# Patient Record
Sex: FEMALE | Race: WHITE | NOT HISPANIC OR LATINO | Employment: UNEMPLOYED | URBAN - METROPOLITAN AREA
[De-identification: names, ages, dates, MRNs, and addresses within clinical notes are randomized per-mention and may not be internally consistent; named-entity substitution may affect disease eponyms.]

---

## 2017-12-06 ENCOUNTER — OFFICE VISIT (OUTPATIENT)
Dept: FAMILY MEDICINE CLINIC | Facility: CLINIC | Age: 47
End: 2017-12-06

## 2017-12-06 VITALS
HEIGHT: 55 IN | RESPIRATION RATE: 16 BRPM | HEART RATE: 98 BPM | TEMPERATURE: 98.3 F | OXYGEN SATURATION: 98 % | DIASTOLIC BLOOD PRESSURE: 84 MMHG | WEIGHT: 174 LBS | BODY MASS INDEX: 40.27 KG/M2 | SYSTOLIC BLOOD PRESSURE: 122 MMHG

## 2017-12-06 DIAGNOSIS — N92.1 MENORRHAGIA WITH IRREGULAR CYCLE: ICD-10-CM

## 2017-12-06 DIAGNOSIS — Z12.31 SCREENING MAMMOGRAM, ENCOUNTER FOR: ICD-10-CM

## 2017-12-06 DIAGNOSIS — N92.6 MENSTRUAL PERIODS IRREGULAR: Primary | ICD-10-CM

## 2017-12-06 DIAGNOSIS — Z13.220 SCREENING, LIPID: ICD-10-CM

## 2017-12-06 PROCEDURE — 99203 OFFICE O/P NEW LOW 30 MIN: CPT | Performed by: PHYSICIAN ASSISTANT

## 2017-12-06 NOTE — PROGRESS NOTES
"Subjective   Karina Powell is a 47 y.o. female.   Chief Complaint   Patient presents with   • lump in throat     np to establish   • menstral issue   • Rash     left leg       History of Present Illness     Karina is a 47-year-old female who presents to establish at today's office visit.  I have reviewed her health history form will try to obtain prior medical records for my review.  She has multiple complaints at today's office visit. Karina recently moved from [place].  She has not had a Pap smear in the past 3-4 years.  She has never had a mammogram.  States she has been having irregular periods home of November.  States she has had heavy flow with large clots.  She has a history of fibroid disease.  Describes her clot formation as worse size $2 coin-sized.  She has had no pain with her periods.  She has had 3 live births.  States after her last pregnancy her periods have become irregular.  She denied any vaginal discharge, pain with intercourse, nausea, vomiting, or abdominal pain.  Using condoms for birth control.  Karina has had a \"lump in her throat\" over the past several months.  She thought it was stress and anxiety related to her move.  She has had some weight gain, constipation and dry skin off and on for several months.  Denied any cold intolerance, hair loss, or mood swings.  Karina has had a rash on her bilateral hips and left lower leg that occurred several weeks ago.  States it itches off and on.  Denied any change in detergents, lotions, soaps or foods.  Karina has not been using any over-the-counter medications.  Appetite has been healthy by decreasing carbohydrates.  Sleep has been normal.  She has not been exercising due to her recent moved to [place].  She has good bowel's support his have a history of chronic lymphocytic leukemia.  She denied any upper history symptoms, chest pain, shortness of air, dysuria, vaginal discharge, abdominal pain or swelling of ankles.       The following " "portions of the patient's history were reviewed and updated as appropriate: allergies, current medications, past family history, past medical history, past social history and past surgical history.    Review of Systems   Constitutional: Negative.    HENT: Negative.    Eyes: Negative.    Respiratory: Negative.  Negative for cough, shortness of breath and wheezing.    Cardiovascular: Negative.  Negative for chest pain, palpitations and leg swelling.   Gastrointestinal: Negative.    Endocrine: Negative.    Genitourinary: Positive for menstrual problem and vaginal bleeding. Negative for decreased urine volume, dyspareunia, pelvic pain, urgency, vaginal discharge and vaginal pain.   Musculoskeletal: Negative.    Skin: Negative.    Allergic/Immunologic: Negative.    Neurological: Negative.    Hematological: Negative.    Psychiatric/Behavioral: Negative.  Negative for sleep disturbance and suicidal ideas.   All other systems reviewed and are negative.    Vitals:    12/06/17 0952   BP: 122/84   BP Location: Right arm   Patient Position: Sitting   Cuff Size: Large Adult   Pulse: 98   Resp: 16   Temp: 98.3 °F (36.8 °C)   TempSrc: Oral   SpO2: 98%   Weight: 78.9 kg (174 lb)   Height: 65 cm (25.59\")     Body mass index is 186.8 kg/(m^2).  No Known Allergies    Objective   Physical Exam   Constitutional: She is oriented to person, place, and time. Vital signs are normal. She appears well-developed and well-nourished.   HENT:   Head: Normocephalic and atraumatic.   Right Ear: Hearing, tympanic membrane, external ear and ear canal normal.   Left Ear: Hearing, tympanic membrane, external ear and ear canal normal.   Nose: Nose normal. Right sinus exhibits no maxillary sinus tenderness and no frontal sinus tenderness. Left sinus exhibits no maxillary sinus tenderness and no frontal sinus tenderness.   Mouth/Throat: Uvula is midline, oropharynx is clear and moist and mucous membranes are normal.   Eyes: Conjunctivae, EOM and lids are " normal.   Neck: Trachea normal and phonation normal. Neck supple. No edema present. No thyroid mass and no thyromegaly present.   Cardiovascular: Normal rate, regular rhythm, S1 normal, S2 normal, normal heart sounds and normal pulses.    Pulmonary/Chest: Effort normal and breath sounds normal.   Abdominal: Soft. Normal appearance, normal aorta and bowel sounds are normal. There is no hepatomegaly. There is no tenderness.   Lymphadenopathy:     She has no cervical adenopathy.   Neurological: She is alert and oriented to person, place, and time.   Reflex Scores:       Patellar reflexes are 2+ on the right side and 2+ on the left side.  Skin: Skin is warm, dry and intact.   Psychiatric: She has a normal mood and affect. Her speech is normal and behavior is normal. Judgment and thought content normal. Cognition and memory are normal.   Very talkative       Assessment/Plan   Karina was seen today for lump in throat, menstral issue and rash.    Diagnoses and all orders for this visit:    Menstrual periods irregular  -     CBC & Differential  -     Comprehensive Metabolic Panel  -     Thyroid Panel With TSH  -     Protime-INR  -     aPTT    Menorrhagia with irregular cycle  -     CBC & Differential  -     Comprehensive Metabolic Panel  -     Thyroid Panel With TSH  -     Protime-INR  -     aPTT    Screening, lipid  -     Lipid Panel With LDL / HDL Ratio    Screening mammogram, encounter for  -     Mammo Screening Bilateral With CAD; Future      1.  New Irregular periods with menorrhagia: I have reviewed Mere's health history form with her at today's office visit.  I will try to obtain prior medical records for my review.  She presents with multiple complaints at today's office visit.  Karina will have a CBC, CMP, thyroid profile with TSH, protime and APTT blood work collected at today's office visit.  I've asked her to return to office in the next 2 weeks for annual physical examination with Pap smear.  I will  consider transvaginal ultrasound if warranted.  Karina will be notified of test results when completed.  2.  Need for screening mammogram: Karina has not had a mammogram at this time.  Recently moved from [place].  I have placed an mammogram ordered to be performed at the [facility].  Karina will schedule her own appointment.  She will be notified of test results when completed.  3.  New screening lipid panel: She is fasting will have a lipid profile collected at today's office visit for further evaluation.  She'll be notified of test results when completed.         Dragon transcription disclaimer    Much of this encounter note is an electronic transcription/translation of spoken language to printed text.  The electronic translation of spoken language may permit erroneous, or at times, nonsensical words or phrases to be inadvertently transcribed.  Although I have reviewed the note for such errors, some may still exist.    Gladys Gandhi PA-C  Family Practice

## 2017-12-07 LAB
ALBUMIN SERPL-MCNC: 4.4 G/DL (ref 3.5–5.2)
ALBUMIN/GLOB SERPL: 2 G/DL
ALP SERPL-CCNC: 36 U/L (ref 40–129)
ALT SERPL-CCNC: 66 U/L (ref 5–33)
APTT PPP: 28 SECONDS (ref 24.3–38.1)
AST SERPL-CCNC: 29 U/L (ref 5–32)
BASOPHILS # BLD AUTO: 0.05 10*3/MM3 (ref 0–0.2)
BASOPHILS NFR BLD AUTO: 0.8 % (ref 0–2)
BILIRUB SERPL-MCNC: 0.4 MG/DL (ref 0.2–1.2)
BUN SERPL-MCNC: 15 MG/DL (ref 6–20)
BUN/CREAT SERPL: 19 (ref 7–25)
CALCIUM SERPL-MCNC: 9 MG/DL (ref 8.6–10.5)
CHLORIDE SERPL-SCNC: 102 MMOL/L (ref 98–107)
CHOLEST SERPL-MCNC: 223 MG/DL (ref 0–200)
CO2 SERPL-SCNC: 23.4 MMOL/L (ref 22–29)
CREAT SERPL-MCNC: 0.79 MG/DL (ref 0.57–1)
EOSINOPHIL # BLD AUTO: 0.14 10*3/MM3 (ref 0.1–0.3)
EOSINOPHIL NFR BLD AUTO: 2.2 % (ref 0–4)
ERYTHROCYTE [DISTWIDTH] IN BLOOD BY AUTOMATED COUNT: 12.8 % (ref 11.5–14.5)
FT4I SERPL CALC-MCNC: 1.5 (ref 1.2–4.9)
GFR SERPLBLD CREATININE-BSD FMLA CKD-EPI: 79 ML/MIN/1.73
GFR SERPLBLD CREATININE-BSD FMLA CKD-EPI: 95 ML/MIN/1.73
GLOBULIN SER CALC-MCNC: 2.2 GM/DL
GLUCOSE SERPL-MCNC: 103 MG/DL (ref 65–99)
HCT VFR BLD AUTO: 41 % (ref 37–47)
HDLC SERPL-MCNC: 54 MG/DL (ref 40–60)
HGB BLD-MCNC: 13.1 G/DL (ref 12–16)
IMM GRANULOCYTES # BLD: 0.01 10*3/MM3 (ref 0–0.03)
IMM GRANULOCYTES NFR BLD: 0.2 % (ref 0–0.5)
INR PPP: 0.94 (ref 0.9–1.1)
LDLC SERPL CALC-MCNC: 141 MG/DL (ref 0–100)
LDLC/HDLC SERPL: 2.6 {RATIO}
LYMPHOCYTES # BLD AUTO: 1.34 10*3/MM3 (ref 0.6–4.8)
LYMPHOCYTES NFR BLD AUTO: 21.1 % (ref 20–45)
MCH RBC QN AUTO: 29.6 PG (ref 27–31)
MCHC RBC AUTO-ENTMCNC: 32 G/DL (ref 31–37)
MCV RBC AUTO: 92.8 FL (ref 81–99)
MONOCYTES # BLD AUTO: 0.38 10*3/MM3 (ref 0–1)
MONOCYTES NFR BLD AUTO: 6 % (ref 3–8)
NEUTROPHILS # BLD AUTO: 4.42 10*3/MM3 (ref 1.5–8.3)
NEUTROPHILS NFR BLD AUTO: 69.7 % (ref 45–70)
NRBC BLD AUTO-RTO: 0 /100 WBC (ref 0–0)
PLATELET # BLD AUTO: 271 10*3/MM3 (ref 140–500)
POTASSIUM SERPL-SCNC: 4.5 MMOL/L (ref 3.5–5.2)
PROT SERPL-MCNC: 6.6 G/DL (ref 6–8.5)
PROTHROMBIN TIME: 12.6 SECONDS (ref 12.1–15)
RBC # BLD AUTO: 4.42 10*6/MM3 (ref 4.2–5.4)
SODIUM SERPL-SCNC: 140 MMOL/L (ref 136–145)
T3RU NFR SERPL: 25 % (ref 24–39)
T4 SERPL-MCNC: 5.9 UG/DL (ref 4.5–12)
TRIGL SERPL-MCNC: 142 MG/DL (ref 0–150)
TSH SERPL DL<=0.005 MIU/L-ACNC: 1.4 UIU/ML (ref 0.45–4.5)
VLDLC SERPL CALC-MCNC: 28.4 MG/DL (ref 7–27)
WBC # BLD AUTO: 6.34 10*3/MM3 (ref 4.8–10.8)

## 2017-12-08 LAB
EBV VCA IGG SER IA-ACNC: 187 U/ML (ref 0–17.9)
EBV VCA IGM SER IA-ACNC: <36 U/ML (ref 0–35.9)
WRITTEN AUTHORIZATION: NORMAL

## 2017-12-11 ENCOUNTER — TELEPHONE (OUTPATIENT)
Dept: FAMILY MEDICINE CLINIC | Facility: CLINIC | Age: 47
End: 2017-12-11

## 2017-12-11 NOTE — TELEPHONE ENCOUNTER
----- Message from Gladys Gandhi PA-C sent at 12/11/2017  6:48 AM EST -----  1.  Notify patient that CBC was normal.  She is not anemic.  2.  Fasting blood sugar was slightly elevated at 103.  Her ALT was slightly elevated at 66.  We will monitor this for now.  3.  Cholesterol was 223, triglycerides 142, HDL 54 and LDL was 141.  Her cholesterol readings were slightly elevated.  Would encourage Karina to decrease her fatty food intake and increase physical activity.  Plan to repeat fasting blood work in 6 months.  4.  Thyroid profile was normal.  5.  Pro time and APTT were normal.  6.  Taye-Hughes  virus shows that she has had mono in the past.  No acute mono is noted.

## 2017-12-14 ENCOUNTER — PROCEDURE VISIT (OUTPATIENT)
Dept: FAMILY MEDICINE CLINIC | Facility: CLINIC | Age: 47
End: 2017-12-14

## 2017-12-14 VITALS
OXYGEN SATURATION: 98 % | WEIGHT: 173 LBS | BODY MASS INDEX: 29.53 KG/M2 | DIASTOLIC BLOOD PRESSURE: 84 MMHG | SYSTOLIC BLOOD PRESSURE: 124 MMHG | HEART RATE: 97 BPM | HEIGHT: 64 IN | RESPIRATION RATE: 16 BRPM | TEMPERATURE: 98.5 F

## 2017-12-14 DIAGNOSIS — J02.9 SORETHROAT: ICD-10-CM

## 2017-12-14 DIAGNOSIS — B37.31 VAGINA, CANDIDIASIS: ICD-10-CM

## 2017-12-14 DIAGNOSIS — N92.1 MENORRHAGIA WITH IRREGULAR CYCLE: ICD-10-CM

## 2017-12-14 DIAGNOSIS — N89.8 VAGINAL DISCHARGE: ICD-10-CM

## 2017-12-14 DIAGNOSIS — Z01.419 PAP SMEAR, AS PART OF ROUTINE GYNECOLOGICAL EXAMINATION: Primary | ICD-10-CM

## 2017-12-14 DIAGNOSIS — Z20.818 STREP THROAT EXPOSURE: ICD-10-CM

## 2017-12-14 LAB
BILIRUB BLD-MCNC: NEGATIVE MG/DL
CLARITY, POC: CLEAR
COLOR UR: YELLOW
DEVELOPER EXPIRATION DATE: NORMAL
DEVELOPER LOT NUMBER: NORMAL
EXPIRATION DATE: NORMAL
EXPIRATION DATE: NORMAL
FECAL OCCULT BLOOD SCREEN, POC: NEGATIVE
GLUCOSE UR STRIP-MCNC: NEGATIVE MG/DL
INTERNAL CONTROL: NORMAL
KETONES UR QL: NEGATIVE
LEUKOCYTE EST, POC: NEGATIVE
Lab: NORMAL
Lab: NORMAL
NEGATIVE CONTROL: NEGATIVE
NITRITE UR-MCNC: NEGATIVE MG/ML
PH UR: 6 [PH] (ref 5–8)
POSITIVE CONTROL: POSITIVE
PROT UR STRIP-MCNC: NEGATIVE MG/DL
RBC # UR STRIP: NEGATIVE /UL
S PYO AG THROAT QL: NEGATIVE
SP GR UR: 1.02 (ref 1–1.03)
UROBILINOGEN UR QL: NORMAL

## 2017-12-14 PROCEDURE — 99396 PREV VISIT EST AGE 40-64: CPT | Performed by: PHYSICIAN ASSISTANT

## 2017-12-14 PROCEDURE — 81002 URINALYSIS NONAUTO W/O SCOPE: CPT | Performed by: PHYSICIAN ASSISTANT

## 2017-12-14 PROCEDURE — 87880 STREP A ASSAY W/OPTIC: CPT | Performed by: PHYSICIAN ASSISTANT

## 2017-12-14 PROCEDURE — 82274 ASSAY TEST FOR BLOOD FECAL: CPT | Performed by: PHYSICIAN ASSISTANT

## 2017-12-14 RX ORDER — AZITHROMYCIN 250 MG/1
TABLET, FILM COATED ORAL
Qty: 6 TABLET | Refills: 0 | Status: SHIPPED | OUTPATIENT
Start: 2017-12-14 | End: 2018-01-31

## 2017-12-14 RX ORDER — FLUCONAZOLE 150 MG/1
TABLET ORAL
Qty: 2 TABLET | Refills: 0 | Status: SHIPPED | OUTPATIENT
Start: 2017-12-14 | End: 2018-01-31

## 2017-12-14 NOTE — PROGRESS NOTES
Subjective   Kraina Powell is a 47 y.o. female.   Chief Complaint   Patient presents with   • Gynecologic Exam   • Sore Throat       History of Present Illness     Karina is a 47 year old female who presents for annual Pap smear examination.  She has lost 1 pound since December 6, 2017.  States her period started on November 11, 2017.  Her menstrual cycle lasted approximately 3 weeks.  She had large blood clots with her blood flow.  She has a history of fibroid uterus in past.  States she has had no pain with her periods.  Karina uses condoms for birth control.  Karina has had a discharge off and on for the past 3 weeks or so.  She has used over-the-counter Monistat with slight relief is vaginal itching.  Denied any fevers, chills, fatigue, abdominal pain, dysuria, urine frequency or dyspareunia.  Bowel movements have been daily without dark black tarry stools.  She does do breast examinations at home.  She has a history of fibrocystic breast.  Karina has scheduled her 3-D mammogram for [facility] on December 27, 2017.  Her children were recently diagnosed with strep throat last week.  Karina started having a bad sore throat last night.  Denied any pain with swallowing, headache, fevers, chills, ear pain, cough, nausea, vomiting or diarrhea.  Her appetite has been slightly decreased for the past day.  Sleep has been normal.      The following portions of the patient's history were reviewed and updated as appropriate: allergies, current medications, past family history, past medical history, past social history and past surgical history.    Review of Systems   Constitutional: Negative.  Negative for chills, fatigue and fever.   HENT: Positive for sore throat. Negative for congestion, ear pain, postnasal drip, rhinorrhea, sinus pain, sinus pressure and trouble swallowing.    Eyes: Negative.    Respiratory: Negative.  Negative for cough, shortness of breath and wheezing.    Cardiovascular: Negative.   "Negative for chest pain, palpitations and leg swelling.   Gastrointestinal: Negative.  Negative for abdominal pain, constipation, diarrhea and vomiting.   Endocrine: Negative.    Genitourinary: Positive for menstrual problem, vaginal bleeding and vaginal discharge. Negative for decreased urine volume, dysuria, pelvic pain, urgency and vaginal pain.   Musculoskeletal: Negative.    Skin: Negative.    Allergic/Immunologic: Negative.    Neurological: Negative.    Hematological: Negative.    Psychiatric/Behavioral: Negative.    All other systems reviewed and are negative.    Vitals:    12/14/17 0945   BP: 124/84   BP Location: Left arm   Patient Position: Sitting   Cuff Size: Adult   Pulse: 97   Resp: 16   Temp: 98.5 °F (36.9 °C)   TempSrc: Oral   SpO2: 98%   Weight: 78.5 kg (173 lb)   Height: 162.6 cm (64\")       Wt Readings from Last 3 Encounters:   12/14/17 78.5 kg (173 lb)   12/06/17 78.9 kg (174 lb)       BP Readings from Last 3 Encounters:   12/14/17 124/84   12/06/17 122/84     Body mass index is 29.7 kg/(m^2).  No Known Allergies    Objective   Physical Exam   Constitutional: She is oriented to person, place, and time. Vital signs are normal. She appears well-developed and well-nourished.   HENT:   Head: Normocephalic and atraumatic.   Right Ear: Hearing, tympanic membrane, external ear and ear canal normal.   Left Ear: Hearing, tympanic membrane, external ear and ear canal normal.   Nose: Nose normal. Right sinus exhibits no maxillary sinus tenderness and no frontal sinus tenderness. Left sinus exhibits no maxillary sinus tenderness and no frontal sinus tenderness.   Mouth/Throat: Uvula is midline and mucous membranes are normal. Posterior oropharyngeal erythema (1+) present.   Eyes: Conjunctivae, EOM and lids are normal. Pupils are equal, round, and reactive to light.   Neck: Trachea normal and phonation normal. Neck supple. No edema present. No thyroid mass and no thyromegaly present.   Cardiovascular: Normal " rate, regular rhythm, S1 normal, S2 normal, normal heart sounds and normal pulses.    Pulmonary/Chest: Effort normal and breath sounds normal. Right breast exhibits no inverted nipple, no mass, no nipple discharge, no skin change and no tenderness. Left breast exhibits no inverted nipple, no mass, no nipple discharge, no skin change and no tenderness. Breasts are symmetrical. There is no breast swelling.   Abdominal: Soft. Normal appearance, normal aorta and bowel sounds are normal. There is no hepatomegaly. There is no tenderness. Hernia confirmed negative in the right inguinal area and confirmed negative in the left inguinal area.   Genitourinary: Rectum normal and uterus normal. Rectal exam shows guaiac negative stool. No breast tenderness, discharge or bleeding. Pelvic exam was performed with patient supine. There is no rash, tenderness, lesion or injury on the right labia. There is no rash, tenderness, lesion or injury on the left labia. Cervix exhibits no motion tenderness and no discharge. Right adnexum displays no mass, no tenderness and no fullness. Left adnexum displays no mass, no tenderness and no fullness. Vaginal discharge (white thick-cott age chesse like) found.   Genitourinary Comments: Exam was chaperoned by Melisa Frazier LPN   Lymphadenopathy:     She has no cervical adenopathy.     She has no axillary adenopathy.        Right: No inguinal adenopathy present.        Left: No inguinal adenopathy present.   Neurological: She is alert and oriented to person, place, and time. She has normal reflexes.   Skin: Skin is warm, dry and intact.   Psychiatric: She has a normal mood and affect. Her speech is normal and behavior is normal. Judgment and thought content normal. Cognition and memory are normal.         Results for orders placed or performed in visit on 12/14/17   POCT rapid strep A   Result Value Ref Range    Rapid Strep A Screen Negative Negative, VALID, INVALID, Not Performed    Internal Control  Passed Passed    Lot Number [id number]     Expiration Date 2282019    POC Occult Blood Stool   Result Value Ref Range    Fecal Occult Blood Negative Negative    Lot Number [id number]     Expiration Date 5312018     DEVELOPER LOT NUMBER [id number]     DEVELOPER EXPIRATION DATE 5312018     Positive Control Positive Positive    Negative Control Negative Negative   POC Urinalysis Dipstick   Result Value Ref Range    Color Yellow Yellow, Straw, Dark Yellow, Ana Maria    Clarity, UA Clear Clear    Glucose, UA Negative Negative, 1000 mg/dL (3+) mg/dL    Bilirubin Negative Negative    Ketones, UA Negative Negative    Specific Gravity  1.025 1.005 - 1.030    Blood, UA Negative Negative    pH, Urine 6.0 5.0 - 8.0    Protein, POC Negative Negative mg/dL    Urobilinogen, UA Normal Normal    Leukocytes Negative Negative    Nitrite, UA Negative Negative     Assessment/Plan   Karina was seen today for gynecologic exam and sore throat.    Diagnoses and all orders for this visit:    Pap smear, as part of routine gynecological examination  -     POC Occult Blood Stool  -     Pap IG, Rfx HPV All Pth - ThinPrep Vial, Cervix  -     POC Urinalysis Dipstick    Sorethroat  -     POCT rapid strep A    Menorrhagia with irregular cycle  -     US Non-ob Transvaginal; Future  -     US Pelvis Complete; Future    Strep throat exposure  -     azithromycin (ZITHROMAX Z-MIRIAM) 250 MG tablet; Take 2 tablets the first day, then 1 tablet daily for 4 days.    Vaginal discharge  -     fluconazole (DIFLUCAN) 150 MG tablet; Tablets a day then take another tablet 4 days later  -     POC Urinalysis Dipstick  -     NuSwab VG+ - Swab, Vagina    Vagina, candidiasis  -     fluconazole (DIFLUCAN) 150 MG tablet; Tablets a day then take another tablet 4 days later      1.  Annual Pap examination with menorrhagia and irregular menstrual cycle: Karina had a Pap smear collected at today's office visit which was sent to the laboratory for further evaluation.  Her in office  hemoccult and urinalysis were negative.  I have scheduled an transvaginal and pelvic ultrasound [facility].  Karina will be notified of test results when completed.  2.  New sore throat with strep throat exposure: Karina's children were diagnosed with strep throat last week.  Her in office strep screen was negative but physical exam is highly suspicious for strep pharyngitis.  I have prescribed a Z-Robert to pharmacy.  Karina was instructed to change her toothbrush after being on antibiotic after 24 hours.  She will increase her fluid intake and rest as much as possible.  She will return to office if no improvement.  3.  New vaginal discharge with vaginal candidiasis: We swab was collected and sent to the laboratory for further evaluation.  I have prescribed Diflucan 150 mg to take as directed to her pharmacy.  Karina will be notified of test results when completed.          Procedure visit on 12/14/2017   Component Date Value Ref Range Status   • Rapid Strep A Screen 12/14/2017 Negative  Negative, VALID, INVALID, Not Performed Final   • Internal Control 12/14/2017 Passed  Passed Final   • Lot Number 12/14/2017 [id number]   Final   • Expiration Date 12/14/2017 5043587   Final   • Fecal Occult Blood 12/14/2017 Negative  Negative Final   • Lot Number 12/14/2017 [id number]   Final   • Expiration Date 12/14/2017 [id number]   Final   • DEVELOPER LOT NUMBER 12/14/2017 [id number]   Final   • DEVELOPER EXPIRATION DATE 12/14/2017 [id number]   Final   • Positive Control 12/14/2017 Positive  Positive Final   • Negative Control 12/14/2017 Negative  Negative Final   • Color 12/14/2017 Yellow  Yellow, Straw, Dark Yellow, Ana Maria Final   • Clarity, UA 12/14/2017 Clear  Clear Final   • Glucose, UA 12/14/2017 Negative  Negative, 1000 mg/dL (3+) mg/dL Final   • Bilirubin 12/14/2017 Negative  Negative Final   • Ketones, UA 12/14/2017 Negative  Negative Final   • Specific Gravity  12/14/2017 1.025  1.005 - 1.030 Final   •  Blood, UA 12/14/2017 Negative  Negative Final   • pH, Urine 12/14/2017 6.0  5.0 - 8.0 Final   • Protein, POC 12/14/2017 Negative  Negative mg/dL Final   • Urobilinogen, UA 12/14/2017 Normal  Normal Final   • Leukocytes 12/14/2017 Negative  Negative Final   • Nitrite, UA 12/14/2017 Negative  Negative Final   Office Visit on 12/06/2017   Component Date Value Ref Range Status   • WBC 12/06/2017 6.34  4.80 - 10.80 10*3/mm3 Final   • RBC 12/06/2017 4.42  4.20 - 5.40 10*6/mm3 Final   • Hemoglobin 12/06/2017 13.1  12.0 - 16.0 g/dL Final   • Hematocrit 12/06/2017 41.0  37.0 - 47.0 % Final   • MCV 12/06/2017 92.8  81.0 - 99.0 fL Final   • MCH 12/06/2017 29.6  27.0 - 31.0 pg Final   • MCHC 12/06/2017 32.0  31.0 - 37.0 g/dL Final   • RDW 12/06/2017 12.8  11.5 - 14.5 % Final   • Platelets 12/06/2017 271  140 - 500 10*3/mm3 Final   • Neutrophil Rel % 12/06/2017 69.7  45.0 - 70.0 % Final   • Lymphocyte Rel % 12/06/2017 21.1  20.0 - 45.0 % Final   • Monocyte Rel % 12/06/2017 6.0  3.0 - 8.0 % Final   • Eosinophil Rel % 12/06/2017 2.2  0.0 - 4.0 % Final   • Basophil Rel % 12/06/2017 0.8  0.0 - 2.0 % Final   • Neutrophils Absolute 12/06/2017 4.42  1.50 - 8.30 10*3/mm3 Final   • Lymphocytes Absolute 12/06/2017 1.34  0.60 - 4.80 10*3/mm3 Final   • Monocytes Absolute 12/06/2017 0.38  0.00 - 1.00 10*3/mm3 Final   • Eosinophils Absolute 12/06/2017 0.14  0.10 - 0.30 10*3/mm3 Final   • Basophils Absolute 12/06/2017 0.05  0.00 - 0.20 10*3/mm3 Final   • Immature Granulocyte Rel % 12/06/2017 0.2  0.0 - 0.5 % Final   • Immature Grans Absolute 12/06/2017 0.01  0.00 - 0.03 10*3/mm3 Final   • nRBC 12/06/2017 0.0  0.0 - 0.0 /100 WBC Final   • Glucose 12/06/2017 103* 65 - 99 mg/dL Final   • BUN 12/06/2017 15  6 - 20 mg/dL Final   • Creatinine 12/06/2017 0.79  0.57 - 1.00 mg/dL Final   • eGFR Non  Am 12/06/2017 79  >60 mL/min/1.73 Final   • eGFR African Am 12/06/2017 95  >60 mL/min/1.73 Final   • BUN/Creatinine Ratio 12/06/2017 19.0  7.0 -  25.0 Final   • Sodium 12/06/2017 140  136 - 145 mmol/L Final   • Potassium 12/06/2017 4.5  3.5 - 5.2 mmol/L Final   • Chloride 12/06/2017 102  98 - 107 mmol/L Final   • Total CO2 12/06/2017 23.4  22.0 - 29.0 mmol/L Final   • Calcium 12/06/2017 9.0  8.6 - 10.5 mg/dL Final   • Total Protein 12/06/2017 6.6  6.0 - 8.5 g/dL Final   • Albumin 12/06/2017 4.40  3.50 - 5.20 g/dL Final   • Globulin 12/06/2017 2.2  gm/dL Final   • A/G Ratio 12/06/2017 2.0  g/dL Final   • Total Bilirubin 12/06/2017 0.4  0.2 - 1.2 mg/dL Final   • Alkaline Phosphatase 12/06/2017 36* 40 - 129 U/L Final   • AST (SGOT) 12/06/2017 29  5 - 32 U/L Final   • ALT (SGPT) 12/06/2017 66* 5 - 33 U/L Final   • Total Cholesterol 12/06/2017 223* 0 - 200 mg/dL Final   • Triglycerides 12/06/2017 142  0 - 150 mg/dL Final   • HDL Cholesterol 12/06/2017 54  40 - 60 mg/dL Final   • VLDL Cholesterol 12/06/2017 28.4* 7 - 27 mg/dL Final   • LDL Cholesterol  12/06/2017 141* 0 - 100 mg/dL Final   • LDL/HDL Ratio 12/06/2017 2.60   Final   • TSH 12/06/2017 1.400  0.450 - 4.500 uIU/mL Final   • T4, Total 12/06/2017 5.9  4.5 - 12.0 ug/dL Final   • T3 Uptake 12/06/2017 25  24 - 39 % Final   • Free Thyroxine Index 12/06/2017 1.5  1.2 - 4.9 Final   • INR 12/06/2017 0.94  0.90 - 1.10 Final    Comment: Therapeutic Ranges for INR:2.0-3.0 (PT 20-30)                              2.5-3.5 (PT 25-34)     • Protime 12/06/2017 12.6  12.1 - 15.0 Seconds Final   • aPTT 12/06/2017 28.0  24.3 - 38.1 Seconds Final   • EBV VCA IgM 12/06/2017 <36.0  0.0 - 35.9 U/mL Final    Comment:                                  Negative        <36.0                                   Equivocal 36.0 - 43.9                                   Positive        >43.9     • EBV VCA IgG 12/06/2017 187.0* 0.0 - 17.9 U/mL Final    Comment:                                  Negative        <18.0                                   Equivocal 18.0 - 21.9                                   Positive        >21.9     • Written  Authorization 12/06/2017 Comment   Final    Comment: Written Authorization Received.  Authorization received from KIRK DOMÍNGUEZ LPN 12-  Logged by [place] [employer] transcription disclaimer    Much of this encounter note is an electronic transcription/translation of spoken language to printed text.  The electronic translation of spoken language may permit erroneous, or at times, nonsensical words or phrases to be inadvertently transcribed.  Although I have reviewed the note for such errors, some may still exist.    Gladys Gandhi PA-C  Family Practice

## 2017-12-19 LAB
A VAGINAE DNA VAG QL NAA+PROBE: NORMAL SCORE
BVAB2 DNA VAG QL NAA+PROBE: NORMAL SCORE
C ALBICANS DNA VAG QL NAA+PROBE: NEGATIVE
C GLABRATA DNA VAG QL NAA+PROBE: NEGATIVE
C TRACH RRNA SPEC QL NAA+PROBE: NEGATIVE
MEGA1 DNA VAG QL NAA+PROBE: NORMAL SCORE
N GONORRHOEA RRNA SPEC QL NAA+PROBE: NEGATIVE
T VAGINALIS RRNA SPEC QL NAA+PROBE: NEGATIVE

## 2017-12-20 ENCOUNTER — TELEPHONE (OUTPATIENT)
Dept: FAMILY MEDICINE CLINIC | Facility: CLINIC | Age: 47
End: 2017-12-20

## 2017-12-20 NOTE — TELEPHONE ENCOUNTER
----- Message from Gladys Gandhi PA-C sent at 12/20/2017  7:38 AM EST -----  Notify patient that her new swab testing was negative.

## 2017-12-21 LAB
CONV .: ABNORMAL
CYTOLOGIST CVX/VAG CYTO: ABNORMAL
CYTOLOGY CVX/VAG DOC THIN PREP: ABNORMAL
DX ICD CODE: ABNORMAL
DX ICD CODE: ABNORMAL
HIV 1 & 2 AB SER-IMP: ABNORMAL
HPV I/H RISK 1 DNA CVX QL PROBE+SIG AMP: NEGATIVE
OTHER STN SPEC: ABNORMAL
PATH REPORT.FINAL DX SPEC: ABNORMAL
PATHOLOGIST CVX/VAG CYTO: ABNORMAL
STAT OF ADQ CVX/VAG CYTO-IMP: ABNORMAL

## 2017-12-27 ENCOUNTER — HOSPITAL ENCOUNTER (OUTPATIENT)
Dept: ULTRASOUND IMAGING | Facility: HOSPITAL | Age: 47
Discharge: HOME | End: 2017-12-27

## 2017-12-27 ENCOUNTER — HOSPITAL ENCOUNTER (OUTPATIENT)
Dept: MAMMOGRAPHY | Facility: HOSPITAL | Age: 47
Discharge: HOME | End: 2017-12-27
Admitting: PHYSICIAN ASSISTANT

## 2017-12-27 DIAGNOSIS — Z12.31 SCREENING MAMMOGRAM, ENCOUNTER FOR: ICD-10-CM

## 2017-12-27 DIAGNOSIS — N92.1 MENORRHAGIA WITH IRREGULAR CYCLE: ICD-10-CM

## 2017-12-27 PROCEDURE — 76856 US EXAM PELVIC COMPLETE: CPT

## 2017-12-27 PROCEDURE — 76830 TRANSVAGINAL US NON-OB: CPT

## 2017-12-27 PROCEDURE — 77063 BREAST TOMOSYNTHESIS BI: CPT

## 2017-12-27 PROCEDURE — G0202 SCR MAMMO BI INCL CAD: HCPCS

## 2017-12-29 ENCOUNTER — TELEPHONE (OUTPATIENT)
Dept: FAMILY MEDICINE CLINIC | Facility: CLINIC | Age: 47
End: 2017-12-29

## 2017-12-29 DIAGNOSIS — D25.9 UTERINE LEIOMYOMA, UNSPECIFIED LOCATION: ICD-10-CM

## 2017-12-29 DIAGNOSIS — N92.6 MENSTRUAL PERIODS IRREGULAR: Primary | ICD-10-CM

## 2017-12-29 NOTE — TELEPHONE ENCOUNTER
----- Message from Colin Card MD sent at 12/28/2017 12:35 PM EST -----  Pelvic US reveals:  multiple fibroids  Normal left ovary.  Right ovary is not identified.

## 2017-12-29 NOTE — TELEPHONE ENCOUNTER
----- Message from Gladys Gandhi PA-C sent at 12/29/2017  9:49 AM EST -----  Notify patient that her Pelvic US showed an enlarged uterus with multiple fibroids.  I will schedule a referral to GYN .

## 2018-01-03 ENCOUNTER — TELEPHONE (OUTPATIENT)
Dept: FAMILY MEDICINE CLINIC | Facility: CLINIC | Age: 48
End: 2018-01-03

## 2018-01-15 ENCOUNTER — OFFICE VISIT (OUTPATIENT)
Dept: OBSTETRICS AND GYNECOLOGY | Facility: CLINIC | Age: 48
End: 2018-01-15

## 2018-01-15 VITALS
SYSTOLIC BLOOD PRESSURE: 120 MMHG | BODY MASS INDEX: 29.71 KG/M2 | HEIGHT: 64 IN | DIASTOLIC BLOOD PRESSURE: 70 MMHG | WEIGHT: 174 LBS

## 2018-01-15 DIAGNOSIS — K43.9 SUPRAUMBILICAL HERNIA: ICD-10-CM

## 2018-01-15 DIAGNOSIS — Z13.9 SCREENING FOR CONDITION: Primary | ICD-10-CM

## 2018-01-15 DIAGNOSIS — D25.0 FIBROIDS, SUBMUCOSAL: ICD-10-CM

## 2018-01-15 DIAGNOSIS — R87.610 ASCUS OF CERVIX WITH NEGATIVE HIGH RISK HPV: ICD-10-CM

## 2018-01-15 DIAGNOSIS — N92.1 MENORRHAGIA WITH IRREGULAR CYCLE: ICD-10-CM

## 2018-01-15 PROBLEM — Z20.818 STREP THROAT EXPOSURE: Status: RESOLVED | Noted: 2017-12-14 | Resolved: 2018-01-15

## 2018-01-15 PROBLEM — Z01.419 PAP SMEAR, AS PART OF ROUTINE GYNECOLOGICAL EXAMINATION: Status: RESOLVED | Noted: 2017-12-14 | Resolved: 2018-01-15

## 2018-01-15 PROBLEM — Z12.31 SCREENING MAMMOGRAM, ENCOUNTER FOR: Status: RESOLVED | Noted: 2017-12-06 | Resolved: 2018-01-15

## 2018-01-15 PROBLEM — N92.6 MENSTRUAL PERIODS IRREGULAR: Status: RESOLVED | Noted: 2017-12-06 | Resolved: 2018-01-15

## 2018-01-15 PROBLEM — J02.9 SORETHROAT: Status: RESOLVED | Noted: 2017-12-14 | Resolved: 2018-01-15

## 2018-01-15 PROBLEM — Z98.891 HISTORY OF 3 CESAREAN SECTIONS: Status: ACTIVE | Noted: 2018-01-15

## 2018-01-15 PROBLEM — N89.8 VAGINAL DISCHARGE: Status: RESOLVED | Noted: 2017-12-14 | Resolved: 2018-01-15

## 2018-01-15 LAB
B-HCG UR QL: NEGATIVE
BILIRUB BLD-MCNC: NEGATIVE MG/DL
CLARITY, POC: CLEAR
COLOR UR: YELLOW
GLUCOSE UR STRIP-MCNC: NEGATIVE MG/DL
INTERNAL NEGATIVE CONTROL: NEGATIVE
INTERNAL POSITIVE CONTROL: POSITIVE
KETONES UR QL: NEGATIVE
LEUKOCYTE EST, POC: NEGATIVE
Lab: NORMAL
NITRITE UR-MCNC: NEGATIVE MG/ML
PH UR: 5 [PH] (ref 5–8)
PROT UR STRIP-MCNC: NEGATIVE MG/DL
RBC # UR STRIP: NEGATIVE /UL
SP GR UR: 1 (ref 1–1.03)
UROBILINOGEN UR QL: NORMAL

## 2018-01-15 PROCEDURE — 81025 URINE PREGNANCY TEST: CPT | Performed by: OBSTETRICS & GYNECOLOGY

## 2018-01-15 PROCEDURE — 81002 URINALYSIS NONAUTO W/O SCOPE: CPT | Performed by: OBSTETRICS & GYNECOLOGY

## 2018-01-15 PROCEDURE — 99204 OFFICE O/P NEW MOD 45 MIN: CPT | Performed by: OBSTETRICS & GYNECOLOGY

## 2018-01-15 RX ORDER — TIZANIDINE 2 MG/1
TABLET ORAL
COMMUNITY
Start: 2017-12-17 | End: 2019-01-15

## 2018-01-15 RX ORDER — IBUPROFEN 800 MG/1
TABLET ORAL
COMMUNITY
Start: 2017-12-17 | End: 2019-01-15

## 2018-01-15 NOTE — PROGRESS NOTES
Patient Care Team:  Gladys Gandhi PA-C as PCP - General (Family Medicine)    Patient new to practice? yes  Patient new to examiner? yes    New problem to the examiner? Yes      HISTORY    Chief Complaint:   Chief Complaint   Patient presents with   • Fibroids     Pt has multiple concerns:   1. ASCUS pap  2. Rare heavy period  3. Uterine fibroids of minimal discomfort  4. Supraumbilical hernia.    HPI: History taken from: patient            Patient's last menstrual period was 12/15/2017 (exact date).      HPI Pt here to f/u on an u/s she had done in [place] that showed fibroids. Pt has a hx of prolonged periods that are now resolved. Pt had been tried on provera withdrawal in [facility].    Pt moved here and has had progressively heavier periods. Last period was 20 days.  Nothing made it better or worse.  Pt has a lot of pressure and pain, and back pain.     Review of Systems   Constitutional: Negative.    HENT: Negative.    Eyes: Negative.    Respiratory: Negative.    Cardiovascular: Negative.    Gastrointestinal: Negative.    Endocrine: Negative.    Genitourinary: Positive for vaginal bleeding.   Musculoskeletal: Negative.    Skin: Negative.    Allergic/Immunologic: Negative.    Neurological: Negative.    Hematological: Negative.    Psychiatric/Behavioral: Negative.        Social History: Smoker:  no.  Counseling given: Not Answered  . .                              Alcohol: occ                             Recreational drugs: none    Family History   Problem Relation Age of Onset   • Hypertension Mother    • Cancer Mother    • Cancer Father    • Breast cancer Neg Hx        Past Surgical History:   Procedure Laterality Date   •  SECTION         Past Medical History:   Diagnosis Date   • Anemia    • Anxiety    • Headache          Current Outpatient Prescriptions:   •  azithromycin (ZITHROMAX Z-MIRIAM) 250 MG tablet, Take 2 tablets the first day, then 1 tablet daily for 4 days., Disp: 6 tablet, Rfl: 0  •   "fluconazole (DIFLUCAN) 150 MG tablet, Tablets a day then take another tablet 4 days later, Disp: 2 tablet, Rfl: 0  •  ibuprofen (ADVIL,MOTRIN) 800 MG tablet, , Disp: , Rfl:   •  tiZANidine (ZANAFLEX) 2 MG tablet, , Disp: , Rfl:         PHYSICAL EXAM    Vital Signs  BP: (120)/(70) 120/70    Flowsheet Rows         First Filed Value    Admission Height  162.2 cm (63.86\") Documented at 01/15/2018 1426    Admission Weight  78.9 kg (174 lb) Documented at 01/15/2018 1426          Physical Exam:    Physical Exam   Constitutional: She is oriented to person, place, and time. She appears well-developed and well-nourished. No distress.   HENT:   Head: Normocephalic.   Eyes: EOM are normal. Pupils are equal, round, and reactive to light.   Neck: Normal range of motion. Neck supple.   Cardiovascular: Normal rate and intact distal pulses.  Exam reveals no gallop and no friction rub.    No murmur heard.  Pulmonary/Chest: Effort normal. No respiratory distress. She has no wheezes. She has no rales. She exhibits no tenderness.   Genitourinary: No vaginal discharge found.   Musculoskeletal: Normal range of motion. She exhibits no edema, tenderness or deformity.   Neurological: She is alert and oriented to person, place, and time.   Skin: Skin is warm and dry. No rash noted. She is not diaphoretic. No erythema. No pallor.   Psychiatric: She has a normal mood and affect. Her behavior is normal. Judgment and thought content normal.   Nursing note and vitals reviewed.      Results Review:     I reviewed the patient's new clinical results.    Lab Results (last 24 hours)     Procedure Component Value Units Date/Time    POC Pregnancy, Urine [[id number]]  (Normal) Collected:  01/15/18 1458    Specimen:  Urine Updated:  01/15/18 1458     HCG, Urine, QL Negative     Lot Number [id number]     Internal Positive Control Positive     Internal Negative Control Negative    POC Urinalysis Dipstick [[id number]] Collected:  01/15/18 1458    Specimen:  Urine " Updated:  01/15/18 1459     Color Yellow     Clarity, UA Clear     Glucose, UA Negative mg/dL      Bilirubin Negative     Ketones, UA Negative     Specific Gravity  1.005     Blood, UA Negative     pH, Urine 5.0     Protein, POC Negative mg/dL      Urobilinogen, UA Normal     Leukocytes Negative     Nitrite, UA Negative          Imaging Results (last 24 hours)     ** No results found for the last 24 hours. **            ECG/EMG Results (most recent)     None          Medication Review:   I have reviewed the patient's current medication list    Current Outpatient Prescriptions:   •  azithromycin (ZITHROMAX Z-MIRIAM) 250 MG tablet, Take 2 tablets the first day, then 1 tablet daily for 4 days., Disp: 6 tablet, Rfl: 0  •  fluconazole (DIFLUCAN) 150 MG tablet, Tablets a day then take another tablet 4 days later, Disp: 2 tablet, Rfl: 0  •  ibuprofen (ADVIL,MOTRIN) 800 MG tablet, , Disp: , Rfl:   •  tiZANidine (ZANAFLEX) 2 MG tablet, , Disp: , Rfl:     MEDICAL DECISION MAKING    Karina was seen today for fibroids.    Diagnoses and all orders for this visit:    Screening for condition  -     POC Urinalysis Dipstick  -     POC Pregnancy, Urine    Menorrhagia with irregular cycle    ASCUS of cervix with negative high risk HPV    Supraumbilical hernia    Fibroids, submucosal      PLAN:  RPT U/S IN 5 MONTHS (ORDERED), CALL IF HEAVY PERIODS WORSEN IN ANY WAY, NEEDS MAMMO IN 1 YR, PAP RPT 1 YR, F/U W/ PCP IF HERNIA HURTS WORSE, CALL IF PELVIC PRESSURE OR PAIN FROM FIBROIDS WORSENS.      RTO Return in about 5 months (around 6/15/2018) for Recheck.    Nile Kuo MD    01/15/18  3:37 PM        Time: More than 50% of time spent in counseling and coordination of care:  Total face-to-face/floor time 45 min.  Time spent in counseling 44 min. Counseling included the following topics: fibroids, abnormal uterine bleeding, abnormal pap smears, hernias. wt loss

## 2018-01-31 ENCOUNTER — OFFICE VISIT (OUTPATIENT)
Dept: FAMILY MEDICINE CLINIC | Facility: CLINIC | Age: 48
End: 2018-01-31

## 2018-01-31 VITALS
HEIGHT: 64 IN | OXYGEN SATURATION: 99 % | WEIGHT: 174 LBS | BODY MASS INDEX: 29.71 KG/M2 | HEART RATE: 97 BPM | RESPIRATION RATE: 16 BRPM | DIASTOLIC BLOOD PRESSURE: 84 MMHG | SYSTOLIC BLOOD PRESSURE: 122 MMHG | TEMPERATURE: 98.4 F

## 2018-01-31 DIAGNOSIS — E66.9 OBESITY (BMI 30.0-34.9): Primary | ICD-10-CM

## 2018-01-31 PROCEDURE — 99213 OFFICE O/P EST LOW 20 MIN: CPT | Performed by: PHYSICIAN ASSISTANT

## 2018-02-03 RX ORDER — PHENTERMINE HYDROCHLORIDE 37.5 MG/1
37.5 CAPSULE ORAL EVERY MORNING
Qty: 30 CAPSULE | Refills: 0
Start: 2018-02-03 | End: 2018-08-03

## 2018-02-04 NOTE — PROGRESS NOTES
I have reviewed the notes, assessments, and/or procedures performed by MITA Harris, I concur with her/his documentation of.Karina Powell.

## 2018-02-15 ENCOUNTER — TELEPHONE (OUTPATIENT)
Dept: FAMILY MEDICINE CLINIC | Facility: CLINIC | Age: 48
End: 2018-02-15

## 2018-08-03 ENCOUNTER — OFFICE VISIT (OUTPATIENT)
Dept: FAMILY MEDICINE CLINIC | Facility: CLINIC | Age: 48
End: 2018-08-03

## 2018-08-03 VITALS
DIASTOLIC BLOOD PRESSURE: 74 MMHG | HEART RATE: 98 BPM | HEIGHT: 64 IN | WEIGHT: 173 LBS | OXYGEN SATURATION: 98 % | SYSTOLIC BLOOD PRESSURE: 130 MMHG | RESPIRATION RATE: 16 BRPM | BODY MASS INDEX: 29.53 KG/M2 | TEMPERATURE: 98.1 F

## 2018-08-03 DIAGNOSIS — E66.3 OVERWEIGHT (BMI 25.0-29.9): ICD-10-CM

## 2018-08-03 DIAGNOSIS — F41.9 ANXIETY: Primary | ICD-10-CM

## 2018-08-03 DIAGNOSIS — G47.00 INSOMNIA, UNSPECIFIED TYPE: ICD-10-CM

## 2018-08-03 PROCEDURE — 99213 OFFICE O/P EST LOW 20 MIN: CPT | Performed by: PHYSICIAN ASSISTANT

## 2018-08-03 RX ORDER — HYDROXYZINE HYDROCHLORIDE 25 MG/1
25 TABLET, FILM COATED ORAL NIGHTLY PRN
Qty: 30 TABLET | Refills: 6 | OUTPATIENT
Start: 2018-08-03 | End: 2019-01-15

## 2018-08-03 NOTE — PROGRESS NOTES
"Subjective   Karina Powell is a 47 y.o. female.   Chief Complaint   Patient presents with   • Weight Loss     management       History of Present Illness     Karina is a 47 year old female who presents for weight issues.  She has been going to the gym 6 times a week.  She works out 60 minutes.  Diet has been healthy.  She has been watching her carbs. Sleep has been normal.  Sleep has been restless due to always thinking about her parent's health.   She has tried OTC Melatonin without relief of sleep issues.          The following portions of the patient's history were reviewed and updated as appropriate: allergies, current medications, past family history, past medical history, past social history and past surgical history.    Review of Systems   Constitutional: Negative.    HENT: Negative.    Eyes: Negative.    Respiratory: Negative.    Cardiovascular: Negative.    Gastrointestinal: Negative.    Endocrine: Negative.    Genitourinary: Negative.    Musculoskeletal: Negative.    Skin: Negative.    Allergic/Immunologic: Negative.    Neurological: Negative.    Hematological: Negative.    Psychiatric/Behavioral: Positive for sleep disturbance. The patient is nervous/anxious.    All other systems reviewed and are negative.      Social History   Substance Use Topics   • Smoking status: Never Smoker   • Smokeless tobacco: Not on file   • Alcohol use Yes      Comment: 6-8 per week     Vitals:    08/03/18 1101   BP: 130/74   BP Location: Left arm   Patient Position: Sitting   Cuff Size: Adult   Pulse: 98   Resp: 16   Temp: 98.1 °F (36.7 °C)   TempSrc: Oral   SpO2: 98%   Weight: 78.5 kg (173 lb)   Height: 162.2 cm (63.86\")       Wt Readings from Last 3 Encounters:   08/03/18 78.5 kg (173 lb)   01/31/18 78.9 kg (174 lb)   01/15/18 78.9 kg (174 lb)       BP Readings from Last 3 Encounters:   08/03/18 130/74   01/31/18 122/84   01/15/18 120/70     Body mass index is 29.83 kg/m².  No Known Allergies    Objective   Physical " Exam   Constitutional: She is oriented to person, place, and time. Vital signs are normal. She appears well-developed and well-nourished.   HENT:   Head: Normocephalic and atraumatic.   Right Ear: Hearing, tympanic membrane, external ear and ear canal normal.   Left Ear: Hearing, tympanic membrane, external ear and ear canal normal.   Nose: Nose normal. Right sinus exhibits no maxillary sinus tenderness and no frontal sinus tenderness. Left sinus exhibits no maxillary sinus tenderness and no frontal sinus tenderness.   Mouth/Throat: Uvula is midline, oropharynx is clear and moist and mucous membranes are normal.   Eyes: Pupils are equal, round, and reactive to light. Conjunctivae, EOM and lids are normal.   Neck: Trachea normal and phonation normal. Neck supple. No edema present. No thyromegaly present.   Cardiovascular: Normal rate, regular rhythm, S1 normal, S2 normal, normal heart sounds and normal pulses.    Pulmonary/Chest: Effort normal and breath sounds normal.   Abdominal: Soft. Normal appearance, normal aorta and bowel sounds are normal. There is no hepatomegaly. There is no tenderness.   Lymphadenopathy:     She has no cervical adenopathy.   Neurological: She is alert and oriented to person, place, and time.   Skin: Skin is warm, dry and intact. Capillary refill takes less than 2 seconds.   Psychiatric: Her speech is normal and behavior is normal. Judgment and thought content normal. Her mood appears anxious. Cognition and memory are normal.       Assessment/Plan   Karina was seen today for weight loss.    Diagnoses and all orders for this visit:    Anxiety  -     hydrOXYzine (ATARAX) 25 MG tablet; Take 1 tablet by mouth At Night As Needed for Anxiety. And sleep    Insomnia, unspecified type  -     hydrOXYzine (ATARAX) 25 MG tablet; Take 1 tablet by mouth At Night As Needed for Anxiety. And sleep    Overweight (BMI 25.0-29.9)        1.  New Anxiety with insomnia: I have prescribed hydroxyzine 25 mg to  take before bedtime nightly.  She will schedule follow-up appointment in 6 weeks for reevaluation.  2.  Improving overweight: Karina is doing well with eating healthy and going to the gym.  She will continue doing her weight loss by dieting and exercising.

## 2019-01-15 ENCOUNTER — APPOINTMENT (OUTPATIENT)
Dept: GENERAL RADIOLOGY | Facility: HOSPITAL | Age: 49
End: 2019-01-15

## 2019-01-15 PROCEDURE — 73660 X-RAY EXAM OF TOE(S): CPT | Performed by: EMERGENCY MEDICINE

## 2019-04-09 ENCOUNTER — TRANSCRIBE ORDERS (OUTPATIENT)
Dept: ADMINISTRATIVE | Facility: HOSPITAL | Age: 49
End: 2019-04-09

## 2019-04-09 DIAGNOSIS — Z12.39 SCREENING BREAST EXAMINATION: Primary | ICD-10-CM

## 2019-04-17 ENCOUNTER — HOSPITAL ENCOUNTER (OUTPATIENT)
Dept: MAMMOGRAPHY | Facility: HOSPITAL | Age: 49
Discharge: HOME | End: 2019-04-17
Admitting: PHYSICIAN ASSISTANT

## 2019-04-17 DIAGNOSIS — Z12.39 SCREENING BREAST EXAMINATION: ICD-10-CM

## 2019-04-17 PROCEDURE — 77063 BREAST TOMOSYNTHESIS BI: CPT

## 2019-04-17 PROCEDURE — 77067 SCR MAMMO BI INCL CAD: CPT

## 2019-04-18 DIAGNOSIS — R92.8 ABNORMAL MAMMOGRAM OF RIGHT BREAST: Primary | ICD-10-CM

## 2019-04-19 ENCOUNTER — OFFICE VISIT (OUTPATIENT)
Dept: OBSTETRICS AND GYNECOLOGY | Facility: CLINIC | Age: 49
End: 2019-04-19

## 2019-04-19 ENCOUNTER — PROCEDURE VISIT (OUTPATIENT)
Dept: OBSTETRICS AND GYNECOLOGY | Facility: CLINIC | Age: 49
End: 2019-04-19

## 2019-04-19 VITALS
SYSTOLIC BLOOD PRESSURE: 128 MMHG | DIASTOLIC BLOOD PRESSURE: 70 MMHG | WEIGHT: 165 LBS | BODY MASS INDEX: 27.49 KG/M2 | HEIGHT: 65 IN

## 2019-04-19 DIAGNOSIS — N92.1 MENORRHAGIA WITH IRREGULAR CYCLE: ICD-10-CM

## 2019-04-19 DIAGNOSIS — Z11.51 SPECIAL SCREENING EXAMINATION FOR HUMAN PAPILLOMAVIRUS (HPV): ICD-10-CM

## 2019-04-19 DIAGNOSIS — D21.9 FIBROIDS: ICD-10-CM

## 2019-04-19 DIAGNOSIS — Z01.419 WOMEN'S ANNUAL ROUTINE GYNECOLOGICAL EXAMINATION: ICD-10-CM

## 2019-04-19 DIAGNOSIS — Z01.419 PAP SMEAR, LOW-RISK: Primary | ICD-10-CM

## 2019-04-19 DIAGNOSIS — D21.9 FIBROIDS: Primary | ICD-10-CM

## 2019-04-19 DIAGNOSIS — Z01.411 ENCOUNTER FOR GYNECOLOGICAL EXAMINATION WITH ABNORMAL FINDING: ICD-10-CM

## 2019-04-19 LAB
B-HCG UR QL: NEGATIVE
BILIRUB BLD-MCNC: NEGATIVE MG/DL
CLARITY, POC: CLEAR
COLOR UR: YELLOW
GLUCOSE UR STRIP-MCNC: NEGATIVE MG/DL
INTERNAL NEGATIVE CONTROL: NEGATIVE
INTERNAL POSITIVE CONTROL: POSITIVE
KETONES UR QL: NEGATIVE
LEUKOCYTE EST, POC: NEGATIVE
Lab: 215
NITRITE UR-MCNC: NEGATIVE MG/ML
PH UR: 5 [PH] (ref 5–8)
PROT UR STRIP-MCNC: NEGATIVE MG/DL
RBC # UR STRIP: NEGATIVE /UL
SP GR UR: 1 (ref 1–1.03)
UROBILINOGEN UR QL: NORMAL

## 2019-04-19 PROCEDURE — 99213 OFFICE O/P EST LOW 20 MIN: CPT | Performed by: OBSTETRICS & GYNECOLOGY

## 2019-04-19 PROCEDURE — 81025 URINE PREGNANCY TEST: CPT | Performed by: OBSTETRICS & GYNECOLOGY

## 2019-04-19 PROCEDURE — 81002 URINALYSIS NONAUTO W/O SCOPE: CPT | Performed by: OBSTETRICS & GYNECOLOGY

## 2019-04-19 PROCEDURE — 99396 PREV VISIT EST AGE 40-64: CPT | Performed by: OBSTETRICS & GYNECOLOGY

## 2019-04-19 PROCEDURE — 76830 TRANSVAGINAL US NON-OB: CPT | Performed by: OBSTETRICS & GYNECOLOGY

## 2019-04-20 PROBLEM — D21.9 FIBROIDS: Status: ACTIVE | Noted: 2019-04-20

## 2019-04-20 PROBLEM — D25.0 FIBROIDS, SUBMUCOSAL: Status: RESOLVED | Noted: 2018-01-15 | Resolved: 2019-04-20

## 2019-04-20 PROBLEM — B37.31 VAGINA, CANDIDIASIS: Status: RESOLVED | Noted: 2017-12-14 | Resolved: 2019-04-20

## 2019-04-21 NOTE — PROGRESS NOTES
" GYN Exam    CC- Here for annual and discussion of her cycles    Karina Powell is a 48 y.o. female established patient of practice who is new to me who presents for annual well woman exam. Periods are irregular, lasting 20-31 days. She has heavy periods and some pelvic pain. She had an US at the hospital in 2017 that showed \"multiple fibroids\" that measured 2.3 - 2.8 cms. Her uterus felt much larger on exam and an US today showed  A 10.36 cm with EL 1.36 cm and multiple fibroids measuring 6.17 x 4.5 cm, 2.8 x 1.8 cm and 1.7 x 1.6 cm. This US was compared to that on 17. She also just had a MMG that was BIRADS 0 and she has additional imaging scheduled next week.     OB History      Para Term  AB Living    4 3 3   1 3    SAB TAB Ectopic Molar Multiple Live Births    1                  Obstetric Comments    1 SAB no D&C  3 C/S- one sone had CHD          Menarche: 15  Current contraception: none  History of abnormal Pap smear: yes -  2017- ASCUS neg HPV pap  History of abnormal mammogram: yes - Birads 0- imaging pending  Family history of uterine, colon or ovarian cancer: no  Family history of breast cancer: no  H/o STDs: none  Gardasil: none  Last pap:2017  Gardasil:missed  MODESTO: none    Health Maintenance   Topic Date Due   • ANNUAL PHYSICAL  1975   • TDAP/TD VACCINES (1 - Tdap) 1991   • LIPID PANEL  2019   • INFLUENZA VACCINE  2019   • PAP SMEAR  2020       Past Medical History:   Diagnosis Date   • Abnormal Pap smear of cervix 2017    ASCUS neg HPV   • Anemia    • Anxiety    • Fibroid    • Headache    • Hyperlipidemia        Past Surgical History:   Procedure Laterality Date   •  SECTION      x 3   • WISDOM TOOTH EXTRACTION         No current outpatient medications on file.    Allergies   Allergen Reactions   • Latex Irritability       Social History     Tobacco Use   • Smoking status: Never Smoker   Substance Use Topics   • Alcohol use: Yes     " "Comment: 6-8 per week   • Drug use: No       Family History   Problem Relation Age of Onset   • Hypertension Mother    • Cancer Mother    • Cancer Father    • Stomach cancer Paternal Grandmother    • Breast cancer Neg Hx    • Ovarian cancer Neg Hx    • Colon cancer Neg Hx    • Deep vein thrombosis Neg Hx    • Pulmonary embolism Neg Hx        Review of Systems   Constitutional: Negative for appetite change, fatigue, fever and unexpected weight change.   Eyes: Negative for photophobia and visual disturbance.   Respiratory: Negative for cough and shortness of breath.    Cardiovascular: Negative for chest pain and palpitations.   Gastrointestinal: Positive for constipation. Negative for abdominal distention, abdominal pain, diarrhea and nausea.   Endocrine: Negative for cold intolerance and heat intolerance.   Genitourinary: Positive for menstrual problem and pelvic pain. Negative for dyspareunia, dysuria and vaginal discharge.   Musculoskeletal: Negative for back pain.   Skin: Negative for color change and rash.   Neurological: Negative for headaches.   Hematological: Negative for adenopathy. Does not bruise/bleed easily.   Psychiatric/Behavioral: Negative for dysphoric mood. The patient is not nervous/anxious.        /70   Ht 165.1 cm (65\")   Wt 74.8 kg (165 lb)   LMP 03/27/2019   Breastfeeding? No   BMI 27.46 kg/m²     Physical Exam   Constitutional: She is oriented to person, place, and time. She appears well-developed and well-nourished.   HENT:   Head: Normocephalic and atraumatic.   Eyes: Conjunctivae are normal. No scleral icterus.   Neck: Neck supple. No thyromegaly present.   Cardiovascular: Normal rate and regular rhythm.   Pulmonary/Chest: Effort normal and breath sounds normal. Right breast exhibits no inverted nipple, no mass, no nipple discharge, no skin change and no tenderness. Left breast exhibits no inverted nipple, no mass, no nipple discharge, no skin change and no tenderness. "   Abdominal: Soft. Bowel sounds are normal. She exhibits no distension and no mass. There is no tenderness. There is no rebound and no guarding. No hernia.   Genitourinary: Pelvic exam was performed with patient supine. There is no rash, tenderness or lesion on the right labia. There is no rash, tenderness or lesion on the left labia. Uterus is enlarged. Uterus is not deviated, not fixed and not tender. Cervix exhibits no motion tenderness, no discharge and no friability. Right adnexum displays no mass, no tenderness and no fullness. Left adnexum displays no mass, no tenderness and no fullness. No erythema, tenderness or bleeding in the vagina. No foreign body in the vagina. No signs of injury around the vagina. No vaginal discharge found.   Neurological: She is alert and oriented to person, place, and time.   Skin: Skin is warm and dry.   Psychiatric: She has a normal mood and affect. Her behavior is normal. Judgment and thought content normal.   Nursing note and vitals reviewed.            Assessment/Plan  1) Fibroid uterus- large fibroid on US today that was not described on US 12/2017. We discussed that this may have been an oversight on the last US or it may be a new finding. We discussed UAE, myomectomy and hysterectomy. Pt is interested in hysterectomy. Long d/w pt regarding R/B/A and pre/intra and postop course/ Will schedule preop and endo biopsy next week. Written info given.   2) GYN HM: pap/HPV  SBE demonstrated and encouraged.  3) STD screening: declines Condoms encouraged.  4) Contraception: pt is not using any contraception and we discussed that she can still get pregnant and they should use condoms.   5) Family Planning: no plans, encourage folic acid daily  6) Diet and Exercise discussed  7) Smoking Status: No  8) Social: , has 3 boys ages 6-15 years  9) MMG- BIRADS 0- has repeat imaging next week.   10)Follow up prn or 1 year       Karina was seen today for gynecologic exam.    Diagnoses  and all orders for this visit:    Pap smear, low-risk  -     POC Urinalysis Dipstick  -     POC Pregnancy, Urine  -     Pap IG, HPV-hr    Women's annual routine gynecological examination  -     POC Urinalysis Dipstick  -     POC Pregnancy, Urine  -     Pap IG, HPV-hr    Special screening examination for human papillomavirus (HPV)  -     POC Urinalysis Dipstick  -     POC Pregnancy, Urine  -     Pap IG, HPV-hr    Encounter for gynecological examination with abnormal finding    Menorrhagia with irregular cycle    Fibroids          Snow Carrera MD  4/19/19  8:59 PM

## 2019-04-22 ENCOUNTER — PROCEDURE VISIT (OUTPATIENT)
Dept: OBSTETRICS AND GYNECOLOGY | Facility: CLINIC | Age: 49
End: 2019-04-22

## 2019-04-22 VITALS
WEIGHT: 167.3 LBS | SYSTOLIC BLOOD PRESSURE: 154 MMHG | DIASTOLIC BLOOD PRESSURE: 106 MMHG | HEIGHT: 65 IN | BODY MASS INDEX: 27.88 KG/M2

## 2019-04-22 DIAGNOSIS — D21.9 FIBROIDS: ICD-10-CM

## 2019-04-22 DIAGNOSIS — N92.1 MENORRHAGIA WITH IRREGULAR CYCLE: ICD-10-CM

## 2019-04-22 DIAGNOSIS — F41.9 ANXIETY: ICD-10-CM

## 2019-04-22 DIAGNOSIS — Z13.9 SCREENING FOR CONDITION: Primary | ICD-10-CM

## 2019-04-22 LAB
B-HCG UR QL: NEGATIVE
INTERNAL NEGATIVE CONTROL: NEGATIVE
INTERNAL POSITIVE CONTROL: POSITIVE
Lab: NORMAL

## 2019-04-22 PROCEDURE — 58100 BIOPSY OF UTERUS LINING: CPT | Performed by: OBSTETRICS & GYNECOLOGY

## 2019-04-22 PROCEDURE — 99214 OFFICE O/P EST MOD 30 MIN: CPT | Performed by: OBSTETRICS & GYNECOLOGY

## 2019-04-22 PROCEDURE — 81025 URINE PREGNANCY TEST: CPT | Performed by: OBSTETRICS & GYNECOLOGY

## 2019-04-22 RX ORDER — ESCITALOPRAM OXALATE 10 MG/1
10 TABLET ORAL DAILY
Qty: 30 TABLET | Refills: 2 | Status: SHIPPED | OUTPATIENT
Start: 2019-04-22 | End: 2020-04-21

## 2019-04-22 NOTE — PROGRESS NOTES
PROCEDURE NOTE    Procedures      Diagnosis  Abnormal Uterine bleeding       Patient's last menstrual period was 03/27/2019.         UCG: negative  Menopausal No   HRT  negative   Current contraception: none    Applying Universal Precautions I properly identified the patient and sought her signature with informed consent.  The reason for the biopsy was discussed.  Using a betadine prep on the cervix the cervix was grasped with a tenaculum and the uterus sounded to 9 cm.  A disposable Pipelle was used to retrieve the specimen by passing it 5 times into the cavity hoping to sample more than one area.     Additional procedures necessary were not necessary  The specimen was labelled and placed in a formalin container.  Tissue was adequate  The patient tolerated the procedure    Instructions were given on vaginal rest, OTC tylenol, Motrin or Aleve, and expected future bleeding.  Resulting and follow up were discussed.    Snow Carrera MD

## 2019-04-22 NOTE — PROGRESS NOTES
Karina Powell is a 48 y.o. patient who presents for follow up of   Chief Complaint   Patient presents with   • Procedure       Ms. Powell is a 88-year-old -0-1-3 who presents for preop for hysterectomy and an endometrial biopsy.  Her periods are irregular and heavy and she also has pelvic pain.  On ultrasound she is noted to have multiple fibroids, the largest of which is 6.1 x 4.5 cm.  This fibroid was not measured or not they are on an ultrasound that was done at the hospital in 2017.  We discussed options including expectant management and ultrasound surveillance, laparoscopic hysterectomy, uterine artery embolization.  I recommend hysterectomy as this may be a fibroid that is growing rapidly and should be excised.  The patient is interested in ovarian conservation of her ovaries appear normal at the time of her procedure.  She is also having significant anxiety related to this procedure as well as stress at home.  She has sick parents in the [facility] and they are from [place].  They do not have a lot of help locally and she is trying to figure out when she can have the surgery so that her  can take off work.  She says she is having panic attacks and is interested in medication that will not make her feel sedated or impaired in any way.      The following portions of the patient's history were reviewed and updated as appropriate: allergies, current medications and problem list.    Review of Systems   Constitutional: Negative for appetite change, fatigue, fever and unexpected weight change.   Eyes: Negative for photophobia and visual disturbance.   Respiratory: Negative for cough and shortness of breath.    Cardiovascular: Negative for chest pain and palpitations.   Gastrointestinal: Positive for constipation. Negative for abdominal distention, abdominal pain, diarrhea and nausea.   Endocrine: Negative for cold intolerance and heat intolerance.   Genitourinary: Positive for menstrual  "problem and pelvic pain. Negative for dyspareunia, dysuria and vaginal discharge.   Musculoskeletal: Negative for back pain.   Skin: Negative for color change and rash.   Neurological: Negative for headaches.   Hematological: Negative for adenopathy. Does not bruise/bleed easily.   Psychiatric/Behavioral: Negative for dysphoric mood. The patient is not nervous/anxious.        BP (!) 154/106   Ht 165.1 cm (65\")   Wt 75.9 kg (167 lb 4.8 oz)   LMP 03/27/2019   BMI 27.84 kg/m²     Physical Exam   Constitutional: She is oriented to person, place, and time. She appears well-developed and well-nourished.   HENT:   Head: Normocephalic and atraumatic.   Eyes: Conjunctivae are normal. No scleral icterus.   Neck: Neck supple. No thyromegaly present.   Cardiovascular: Normal rate and regular rhythm.   Pulmonary/Chest: Effort normal and breath sounds normal. Right breast exhibits no inverted nipple, no mass, no nipple discharge, no skin change and no tenderness. Left breast exhibits no inverted nipple, no mass, no nipple discharge, no skin change and no tenderness.   Abdominal: Soft. Bowel sounds are normal. She exhibits no distension and no mass. There is no tenderness. There is no rebound and no guarding. No hernia.   Genitourinary: Pelvic exam was performed with patient supine. There is no rash, tenderness or lesion on the right labia. There is no rash, tenderness or lesion on the left labia. Uterus is enlarged. Uterus is not deviated, not fixed and not tender. Cervix exhibits no motion tenderness, no discharge and no friability. Right adnexum displays no mass, no tenderness and no fullness. Left adnexum displays no mass, no tenderness and no fullness. No erythema, tenderness or bleeding in the vagina. No foreign body in the vagina. No signs of injury around the vagina. No vaginal discharge found.   Genitourinary Comments: SEE ENDO BX NOTE   Neurological: She is alert and oriented to person, place, and time.   Skin: Skin " is warm and dry.   Psychiatric: She has a normal mood and affect. Her behavior is normal. Judgment and thought content normal.   Nursing note and vitals reviewed.    A/P:  1. Anxiety - will start Lexapro 10 mg po QD. R/B/A of medication d/w pt and all questions answered. She is advised to call for any significant side effects or concerns.   2.  Fibroid uterus- s/p endo biopsy today. The large fibroid on ultrasound this week was not described on ultrasound in 2017 we discussed that this may be have been an oversight on the last ultrasound or it may be a new finding.  She is also symptomatic with heavy periods that are painful.  We discussed uterine artery embolization, myomectomy as well as hysterectomy.  We plan total laparoscopic hysterectomy with bilateral salpingectomy, possible total abdominal hysterectomy, possible bilateral oophorectomy.  We discussed at length that she may need additional procedures if a malignancy is discovered in the course of her surgery.  We also discussed that she is at high risk of bladder injury given her 3  sections.Risks, benefits and alternatives of the procedure were discussed, including , but not limited to: infection, bleeding, transfusion, injury to adjacent structures, laparotomy, possible non diagnostic findings, reoperation, recurrent symptoms, thromboembolic events, aneasthesic complications and death. Pre/intra/postop course was reviewed and all questions answered. Patient was encouraged to call for any additional questions she might have in the future.         Assessment/Plan   Karina was seen today for procedure.    Diagnoses and all orders for this visit:    Screening for condition  -     POC Pregnancy, Urine    Fibroids  -     Case Request; Standing  -     ceFAZolin (ANCEF) 2 g in sodium chloride 0.9 % 100 mL IVPB  -     Case Request    Anxiety    Menorrhagia with irregular cycle  -     Reference Histopathology  -     Case Request; Standing  -      ceFAZolin (ANCEF) 2 g in sodium chloride 0.9 % 100 mL IVPB  -     Case Request    Other orders  -     escitalopram (LEXAPRO) 10 MG tablet; Take 1 tablet by mouth Daily.  -     Follow Anesthesia Guidelines / Standing Orders; Future  -     Follow Anesthesia Guidelines / Standing Orders; Standing  -     Obtain Informed Consent; Standing  -     Place Sequential Compression Device; Standing                   No Follow-up on file.      Snow Carrera MD  4/22/19  1:49 PM

## 2019-04-23 ENCOUNTER — OFFICE VISIT (OUTPATIENT)
Dept: FAMILY MEDICINE CLINIC | Facility: CLINIC | Age: 49
End: 2019-04-23

## 2019-04-23 ENCOUNTER — HOSPITAL ENCOUNTER (OUTPATIENT)
Dept: MAMMOGRAPHY | Facility: HOSPITAL | Age: 49
Discharge: HOME | End: 2019-04-23
Admitting: PHYSICIAN ASSISTANT

## 2019-04-23 ENCOUNTER — APPOINTMENT (OUTPATIENT)
Dept: ULTRASOUND IMAGING | Facility: HOSPITAL | Age: 49
End: 2019-04-23

## 2019-04-23 VITALS
WEIGHT: 166 LBS | HEART RATE: 80 BPM | TEMPERATURE: 97.9 F | SYSTOLIC BLOOD PRESSURE: 154 MMHG | RESPIRATION RATE: 16 BRPM | DIASTOLIC BLOOD PRESSURE: 100 MMHG | OXYGEN SATURATION: 98 % | HEIGHT: 65 IN | BODY MASS INDEX: 27.66 KG/M2

## 2019-04-23 DIAGNOSIS — F41.9 ANXIETY: Primary | ICD-10-CM

## 2019-04-23 DIAGNOSIS — R92.8 ABNORMAL MAMMOGRAM OF RIGHT BREAST: ICD-10-CM

## 2019-04-23 PROCEDURE — G0279 TOMOSYNTHESIS, MAMMO: HCPCS

## 2019-04-23 PROCEDURE — 77065 DX MAMMO INCL CAD UNI: CPT

## 2019-04-23 PROCEDURE — 99213 OFFICE O/P EST LOW 20 MIN: CPT | Performed by: NURSE PRACTITIONER

## 2019-04-23 RX ORDER — HYDROXYZINE HYDROCHLORIDE 25 MG/1
25 TABLET, FILM COATED ORAL 3 TIMES DAILY PRN
Qty: 30 TABLET | Refills: 0 | Status: SHIPPED | OUTPATIENT
Start: 2019-04-23

## 2019-04-23 NOTE — PROGRESS NOTES
"Subjective      Chief Complaint   Patient presents with   • Anxiety       Karina Powell is a 48 y.o. female who presents for new evaluation and treatment of anxiety disorder and panic attacks. She has the following anxiety symptoms: insomnia, palpitations, panic attacks, racing thoughts and shortness of breath. Onset of symptoms was approximately 2 days ago. Symptoms have been unchanged since that time. She denies current suicidal and homicidal ideation. Family history significant for no psychiatric illness. Risk factors: negative life event new findings with ultrasound and underwent uterine bx yesterday.  Repeat mammogram today due to abnormality.  . Anxious due to awaiting results.  Previous treatment includes none. She complains of the following medication side effects: none.  Prescribed Lexapro per GYN however had not picked up from pharmacy yet.  She is worried about starting long-term medication.  She has had success in the past with hydroxyzine for control of anxiety and difficulty sleeping.      The following portions of the patient's history were reviewed and updated as appropriate: past family history, past social history, past surgical history and problem list.    Review of Systems  A comprehensive review of systems was negative except for: Behavioral/Psych: positive for anxiety and sleep disturbance    Vitals:    04/23/19 1454   BP: 154/100 - Rechecked at 148/90   Pulse: 80   Resp: 16   Temp: 97.9 °F (36.6 °C)   SpO2: 98%       Objective   /100 (BP Location: Left arm, Patient Position: Sitting, Cuff Size: Adult)   Pulse 80   Temp 97.9 °F (36.6 °C)   Resp 16   Ht 165.1 cm (65\")   Wt 75.3 kg (166 lb)   LMP 03/27/2019   SpO2 98%   BMI 27.62 kg/m²   General appearance: alert, appears stated age, cooperative and anxious  Lungs: clear to auscultation bilaterally  Heart: regular rate and rhythm, S1, S2 normal, no murmur, click, rub or gallop  Neurologic: Alert and oriented X 3, normal strength " and tone. Normal symmetric reflexes. Normal coordination and gait     Assessment/Plan Situational anxiety. Possible organic contributing causes are: none.    Instructed hopefully once she receives results of further testing, her anxiety will decrease.  She would like to hold off on starting Lexapro.  Instructed okay to hold off for now.  Take the hydroxyzine as directed.  Discussed the need of Lexapro with Gladys during next office visit.  Medications: hydroxyzine.  Reviewed concept of anxiety as biochemical imbalance of neurotransmitters and rationale for treatment.  Instructed patient to contact office or on-call physician promptly should condition worsen or any new symptoms appear and provided on-call telephone numbers. IF THE PATIENT HAS ANY SUICIDAL OR HOMICIDAL IDEATIONS, CALL THE OFFICE, DISCUSS WITH A SUPPORT MEMBER, OR GO TO THE ER IMMEDIATELY. Patient was agreeable with this plan.  Follow up: 2 weeks.as scheduled for routine physical with Gladys.  Return sooner for any concerns or worsening of symptoms.    Snow Holcomb, APRN

## 2019-04-25 ENCOUNTER — TELEPHONE (OUTPATIENT)
Dept: OBSTETRICS AND GYNECOLOGY | Facility: CLINIC | Age: 49
End: 2019-04-25

## 2019-04-25 LAB
CYTOLOGIST CVX/VAG CYTO: NORMAL
CYTOLOGY CVX/VAG DOC THIN PREP: NORMAL
DX ICD CODE: NORMAL
DX ICD CODE: NORMAL
HIV 1 & 2 AB SER-IMP: NORMAL
HPV I/H RISK 1 DNA CVX QL PROBE+SIG AMP: NEGATIVE
Lab: NORMAL
OTHER STN SPEC: NORMAL
PATH REPORT.FINAL DX SPEC: NORMAL
PATH REPORT.FINAL DX SPEC: NORMAL
PATH REPORT.GROSS SPEC: NORMAL
PATH REPORT.SITE OF ORIGIN SPEC: NORMAL
PATHOLOGIST NAME: NORMAL
PAYMENT PROCEDURE: NORMAL
STAT OF ADQ CVX/VAG CYTO-IMP: NORMAL

## 2019-04-25 NOTE — TELEPHONE ENCOUNTER
Patient calling states she has received her labs and would like to discuss with you before her surgery scheduled June 4. Would like to come in and discuss different options with you. Would you like to bring her in earlier? Please advise

## 2019-05-01 DIAGNOSIS — Z13.220 SCREENING, LIPID: Primary | ICD-10-CM

## 2019-05-01 DIAGNOSIS — Z00.00 ANNUAL PHYSICAL EXAM: ICD-10-CM

## 2019-05-06 ENCOUNTER — OFFICE VISIT (OUTPATIENT)
Dept: OBSTETRICS AND GYNECOLOGY | Facility: CLINIC | Age: 49
End: 2019-05-06

## 2019-05-06 VITALS
SYSTOLIC BLOOD PRESSURE: 134 MMHG | DIASTOLIC BLOOD PRESSURE: 88 MMHG | WEIGHT: 167.2 LBS | BODY MASS INDEX: 27.86 KG/M2 | HEIGHT: 65 IN

## 2019-05-06 DIAGNOSIS — N92.1 MENORRHAGIA WITH IRREGULAR CYCLE: ICD-10-CM

## 2019-05-06 DIAGNOSIS — D21.9 FIBROIDS: Primary | ICD-10-CM

## 2019-05-06 DIAGNOSIS — Z71.89 COUNSELING REGARDING GOALS OF CARE: ICD-10-CM

## 2019-05-06 PROCEDURE — 99214 OFFICE O/P EST MOD 30 MIN: CPT | Performed by: OBSTETRICS & GYNECOLOGY

## 2019-05-06 NOTE — PROGRESS NOTES
"      Karina Powell is a 48 y.o. patient who presents for follow up of   Chief Complaint   Patient presents with   • Menstrual Problem     48-year-old established patient here to discuss treatment options for fibroids and menorrhagia.  The patient was seen initially in April for an annual exam and was complaining of heavy vaginal bleeding and pelvic pain.  Exam showed an enlarged uterus and ultrasound confirmed a multi-fibroid uterus with the largest fibroid being greater than 6 cm.  We had several appointments discussing treatment including expectant management with surveillance by ultrasound, uterine artery embolization and hysterectomy.  The patient had decided on hysterectomy.  Over the weekend she talked to some of her family members who are also physicians (neurology), and decided that she does not want a hysterectomy at this time.  She is concerned \"there are too many hysterectomy is done in the [place]\".  She is also concerned about possible future prolapse.  She is also concerned that \"we may be taken out things that are normal\".        The following portions of the patient's history were reviewed and updated as appropriate: allergies, current medications and problem list.    Review of Systems   Constitutional: Positive for fatigue.   Genitourinary: Positive for menstrual problem, pelvic pain and vaginal bleeding.   All other systems reviewed and are negative.      /88   Ht 165.1 cm (65\")   Wt 75.8 kg (167 lb 3.2 oz)   LMP 05/05/2019 (Exact Date)   Breastfeeding? No   BMI 27.82 kg/m²     Physical Exam   Constitutional: She is oriented to person, place, and time. She appears well-developed and well-nourished.   HENT:   Head: Normocephalic and atraumatic.   Abdominal: Soft. Bowel sounds are normal. She exhibits no distension and no mass. There is no tenderness. There is no rebound and no guarding. No hernia.   Neurological: She is alert and oriented to person, place, and time.   Skin: Skin " is warm and dry.   Psychiatric: She has a normal mood and affect. Her behavior is normal. Judgment and thought content normal.   Nursing note and vitals reviewed.    A/P:  1.  Large fibroid uterus and menorrhagia- had an extensive discussion with the patient regarding risks, benefits and alternatives of all treatment options.  We discussed that there are options that will help decrease the amount that she bleeds, including oral contraceptive pills, Lysteda, Mirena IUD.  Unfortunately, these methods do nothing to address the fact that she has a large fibroid in her uterus that appears to have grown rapidly over a short amount time.  We discussed options for management of a large fibroid uterus including expectant management with ultrasound surveillance, uterine artery embolization, open myomectomy and hysterectomy.  We discussed some of the limitations of uterine artery embolization, particularly in that we do not do not have a tissue diagnosis of her largest 6 cm fibroid.  We also discussed open myomectomy versus laparoscopic hysterectomy.  If the patient is interested in keeping her cervix, open hysterectomy is indicated.  We did discuss that she does have multiple fibroids that will need to be removed, we also discussed that there is a possibility that she could develop additional fibroids that may require  may or may not require treatment in the future.  We also discussed that if the pathology of these fibroids returns and is abnormal, she would need an additional procedure to remove the remaining portion of her uterus.  The patient has significant social stressors and is concerned about taking time away from her family and off of work.  For this reason, I do think a laparoscopic hysterectomy would be a better choice for her.  We also discussed that many of her concerns are certainly valid in general terms.  There are certainly too many hysterectomies done in the [place], however, she certainly has an  indication for hysterectomy that is compelling.  Also, while prolapse is a future concern, it is certainly less of a concern than her most immediate issues.  I told the patient I am concerned that she feels rushed to make a decision.  When we last met, she was extremely anxious and wanted to get this process moving as fast as possible.  I have accommodated that desire, but I certainly do not want her to make a decision without having time to think about it and have all of her questions addressed.  I recommended that we cancel her June surgery and have her come back in the office in 2 months for repeat ultrasound.  I have also encouraged her to write down all of her questions and to bring any family members or friends with her to her appointment so that we can make sure that everyone's issues and concerns are addressed.  The patient is asked that I keep her appointment for surgery for June and she will let me know within the week whether or not she would like to proceed.  2. RTO 2 months TVUS and visit.     Assessment/Plan   Karina was seen today for menstrual problem.    Diagnoses and all orders for this visit:    Fibroids    Menorrhagia with irregular cycle    Counseling regarding goals of care                   No Follow-up on file.      Snow Carrera MD  5/6/19  9:20 AM

## 2020-06-10 ENCOUNTER — APPOINTMENT (OUTPATIENT)
Dept: MAMMOGRAPHY | Facility: HOSPITAL | Age: 50
End: 2020-06-10

## 2020-06-17 ENCOUNTER — APPOINTMENT (OUTPATIENT)
Dept: MAMMOGRAPHY | Facility: HOSPITAL | Age: 50
End: 2020-06-17

## 2023-02-27 ENCOUNTER — APPOINTMENT (OUTPATIENT)
Dept: GENERAL RADIOLOGY | Facility: HOSPITAL | Age: 53
End: 2023-02-27
Payer: COMMERCIAL

## 2023-02-27 ENCOUNTER — HOSPITAL ENCOUNTER (EMERGENCY)
Facility: HOSPITAL | Age: 53
Discharge: HOME | End: 2023-02-27
Attending: EMERGENCY MEDICINE | Admitting: EMERGENCY MEDICINE
Payer: COMMERCIAL

## 2023-02-27 VITALS
DIASTOLIC BLOOD PRESSURE: 112 MMHG | SYSTOLIC BLOOD PRESSURE: 167 MMHG | TEMPERATURE: 99.1 F | OXYGEN SATURATION: 100 % | RESPIRATION RATE: 16 BRPM | HEIGHT: 64 IN | WEIGHT: 160 LBS | HEART RATE: 85 BPM | BODY MASS INDEX: 27.31 KG/M2

## 2023-02-27 DIAGNOSIS — I49.3 PVC'S (PREMATURE VENTRICULAR CONTRACTIONS): Primary | ICD-10-CM

## 2023-02-27 DIAGNOSIS — R00.2 PALPITATIONS: ICD-10-CM

## 2023-02-27 LAB
ALBUMIN SERPL-MCNC: 4.6 G/DL (ref 3.5–5.2)
ALBUMIN/GLOB SERPL: 1.8 G/DL
ALP SERPL-CCNC: 50 U/L (ref 39–117)
ALT SERPL W P-5'-P-CCNC: 35 U/L (ref 1–33)
ANION GAP SERPL CALCULATED.3IONS-SCNC: 13 MMOL/L (ref 5–15)
AST SERPL-CCNC: 22 U/L (ref 1–32)
BASOPHILS # BLD AUTO: 0.05 10*3/MM3 (ref 0–0.2)
BASOPHILS NFR BLD AUTO: 0.7 % (ref 0–1.5)
BILIRUB SERPL-MCNC: 0.3 MG/DL (ref 0–1.2)
BILIRUB UR QL STRIP: NEGATIVE
BUN SERPL-MCNC: 18 MG/DL (ref 6–20)
BUN/CREAT SERPL: 25.4 (ref 7–25)
CALCIUM SPEC-SCNC: 9.6 MG/DL (ref 8.6–10.5)
CHLORIDE SERPL-SCNC: 100 MMOL/L (ref 98–107)
CLARITY UR: CLEAR
CO2 SERPL-SCNC: 25 MMOL/L (ref 22–29)
COLOR UR: YELLOW
CREAT SERPL-MCNC: 0.71 MG/DL (ref 0.57–1)
DEPRECATED RDW RBC AUTO: 39.6 FL (ref 37–54)
EGFRCR SERPLBLD CKD-EPI 2021: 103.7 ML/MIN/1.73
EOSINOPHIL # BLD AUTO: 0.18 10*3/MM3 (ref 0–0.4)
EOSINOPHIL NFR BLD AUTO: 2.6 % (ref 0.3–6.2)
ERYTHROCYTE [DISTWIDTH] IN BLOOD BY AUTOMATED COUNT: 12 % (ref 12.3–15.4)
GLOBULIN UR ELPH-MCNC: 2.6 GM/DL
GLUCOSE SERPL-MCNC: 94 MG/DL (ref 65–99)
GLUCOSE UR STRIP-MCNC: NEGATIVE MG/DL
HCT VFR BLD AUTO: 42.4 % (ref 34–46.6)
HGB BLD-MCNC: 14.4 G/DL (ref 12–15.9)
HGB UR QL STRIP.AUTO: NEGATIVE
HOLD SPECIMEN: NORMAL
HOLD SPECIMEN: NORMAL
IMM GRANULOCYTES # BLD AUTO: 0.02 10*3/MM3 (ref 0–0.05)
IMM GRANULOCYTES NFR BLD AUTO: 0.3 % (ref 0–0.5)
KETONES UR QL STRIP: NEGATIVE
LEUKOCYTE ESTERASE UR QL STRIP.AUTO: NEGATIVE
LYMPHOCYTES # BLD AUTO: 2.27 10*3/MM3 (ref 0.7–3.1)
LYMPHOCYTES NFR BLD AUTO: 33.3 % (ref 19.6–45.3)
MCH RBC QN AUTO: 30.6 PG (ref 26.6–33)
MCHC RBC AUTO-ENTMCNC: 34 G/DL (ref 31.5–35.7)
MCV RBC AUTO: 90 FL (ref 79–97)
MONOCYTES # BLD AUTO: 0.43 10*3/MM3 (ref 0.1–0.9)
MONOCYTES NFR BLD AUTO: 6.3 % (ref 5–12)
NEUTROPHILS NFR BLD AUTO: 3.86 10*3/MM3 (ref 1.7–7)
NEUTROPHILS NFR BLD AUTO: 56.8 % (ref 42.7–76)
NITRITE UR QL STRIP: NEGATIVE
NRBC BLD AUTO-RTO: 0 /100 WBC (ref 0–0.2)
PH UR STRIP.AUTO: 7 [PH] (ref 4.5–8)
PLATELET # BLD AUTO: 263 10*3/MM3 (ref 140–450)
PMV BLD AUTO: 9.6 FL (ref 6–12)
POTASSIUM SERPL-SCNC: 3.4 MMOL/L (ref 3.5–5.2)
PROT SERPL-MCNC: 7.2 G/DL (ref 6–8.5)
PROT UR QL STRIP: NEGATIVE
QT INTERVAL: 386 MS
RBC # BLD AUTO: 4.71 10*6/MM3 (ref 3.77–5.28)
SODIUM SERPL-SCNC: 138 MMOL/L (ref 136–145)
SP GR UR STRIP: 1.01 (ref 1–1.03)
TROPONIN T SERPL HS-MCNC: <6 NG/L
UROBILINOGEN UR QL STRIP: NORMAL
WBC NRBC COR # BLD: 6.81 10*3/MM3 (ref 3.4–10.8)
WHOLE BLOOD HOLD COAG: NORMAL
WHOLE BLOOD HOLD SPECIMEN: NORMAL

## 2023-02-27 PROCEDURE — 99283 EMERGENCY DEPT VISIT LOW MDM: CPT

## 2023-02-27 PROCEDURE — 81003 URINALYSIS AUTO W/O SCOPE: CPT

## 2023-02-27 PROCEDURE — 25010000002 LORAZEPAM PER 2 MG: Performed by: EMERGENCY MEDICINE

## 2023-02-27 PROCEDURE — 84484 ASSAY OF TROPONIN QUANT: CPT

## 2023-02-27 PROCEDURE — 80053 COMPREHEN METABOLIC PANEL: CPT

## 2023-02-27 PROCEDURE — 85025 COMPLETE CBC W/AUTO DIFF WBC: CPT

## 2023-02-27 PROCEDURE — 71045 X-RAY EXAM CHEST 1 VIEW: CPT

## 2023-02-27 PROCEDURE — 96374 THER/PROPH/DIAG INJ IV PUSH: CPT

## 2023-02-27 PROCEDURE — 93010 ELECTROCARDIOGRAM REPORT: CPT | Performed by: INTERNAL MEDICINE

## 2023-02-27 PROCEDURE — 93005 ELECTROCARDIOGRAM TRACING: CPT | Performed by: EMERGENCY MEDICINE

## 2023-02-27 RX ORDER — LORAZEPAM 2 MG/ML
1 INJECTION INTRAMUSCULAR ONCE
Status: COMPLETED | OUTPATIENT
Start: 2023-02-27 | End: 2023-02-27

## 2023-02-27 RX ORDER — CLONIDINE HYDROCHLORIDE 0.1 MG/1
0.1 TABLET ORAL ONCE
Status: COMPLETED | OUTPATIENT
Start: 2023-02-27 | End: 2023-02-27

## 2023-02-27 RX ORDER — SODIUM CHLORIDE 0.9 % (FLUSH) 0.9 %
10 SYRINGE (ML) INJECTION AS NEEDED
Status: DISCONTINUED | OUTPATIENT
Start: 2023-02-27 | End: 2023-02-27 | Stop reason: HOSPADM

## 2023-02-27 RX ADMIN — LORAZEPAM 1 MG: 2 INJECTION INTRAMUSCULAR; INTRAVENOUS at 18:55

## 2023-02-27 RX ADMIN — SODIUM CHLORIDE 1000 ML: 9 INJECTION, SOLUTION INTRAVENOUS at 18:55

## 2023-02-27 RX ADMIN — CLONIDINE HYDROCHLORIDE 0.1 MG: 0.1 TABLET ORAL at 20:32

## 2023-02-28 ENCOUNTER — HOSPITAL ENCOUNTER (OUTPATIENT)
Dept: CARDIOLOGY | Facility: HOSPITAL | Age: 53
Discharge: HOME | End: 2023-02-28
Admitting: EMERGENCY MEDICINE
Payer: COMMERCIAL

## 2023-02-28 PROCEDURE — 93225 XTRNL ECG REC<48 HRS REC: CPT

## 2023-02-28 PROCEDURE — 93226 XTRNL ECG REC<48 HR SCAN A/R: CPT

## 2023-03-10 LAB
MAXIMAL PREDICTED HEART RATE: 170 BPM
STRESS TARGET HR: 145 BPM

## 2023-03-10 PROCEDURE — 93227 XTRNL ECG REC<48 HR R&I: CPT | Performed by: INTERNAL MEDICINE

## 2023-11-28 ENCOUNTER — TRANSCRIBE ORDERS (OUTPATIENT)
Dept: ADMINISTRATIVE | Facility: HOSPITAL | Age: 53
End: 2023-11-28
Payer: COMMERCIAL

## 2023-11-28 DIAGNOSIS — Z12.31 VISIT FOR SCREENING MAMMOGRAM: Primary | ICD-10-CM

## 2023-12-20 ENCOUNTER — HOSPITAL ENCOUNTER (OUTPATIENT)
Dept: MAMMOGRAPHY | Facility: HOSPITAL | Age: 53
Discharge: HOME | End: 2023-12-20
Admitting: FAMILY MEDICINE
Payer: COMMERCIAL

## 2023-12-20 DIAGNOSIS — Z12.31 VISIT FOR SCREENING MAMMOGRAM: ICD-10-CM

## 2023-12-20 PROCEDURE — 77063 BREAST TOMOSYNTHESIS BI: CPT

## 2023-12-20 PROCEDURE — 77067 SCR MAMMO BI INCL CAD: CPT

## 2024-11-13 ENCOUNTER — TRANSCRIBE ORDERS (OUTPATIENT)
Dept: MAMMOGRAPHY | Facility: HOSPITAL | Age: 54
End: 2024-11-13
Payer: COMMERCIAL

## 2024-11-13 DIAGNOSIS — Z12.31 ENCOUNTER FOR SCREENING MAMMOGRAM FOR BREAST CANCER: Primary | ICD-10-CM

## 2025-02-12 ENCOUNTER — TRANSCRIBE ORDERS (OUTPATIENT)
Dept: ADMINISTRATIVE | Facility: HOSPITAL | Age: 55
End: 2025-02-12
Payer: COMMERCIAL

## 2025-02-12 DIAGNOSIS — Z12.31 VISIT FOR SCREENING MAMMOGRAM: Primary | ICD-10-CM

## 2025-03-07 ENCOUNTER — HOSPITAL ENCOUNTER (OUTPATIENT)
Dept: MAMMOGRAPHY | Facility: HOSPITAL | Age: 55
Discharge: HOME | End: 2025-03-07
Admitting: FAMILY MEDICINE
Payer: COMMERCIAL

## 2025-03-07 DIAGNOSIS — Z12.31 VISIT FOR SCREENING MAMMOGRAM: ICD-10-CM

## 2025-03-07 PROCEDURE — 77067 SCR MAMMO BI INCL CAD: CPT

## 2025-03-07 PROCEDURE — 77063 BREAST TOMOSYNTHESIS BI: CPT
